# Patient Record
Sex: FEMALE | Race: WHITE | NOT HISPANIC OR LATINO | Employment: FULL TIME | URBAN - METROPOLITAN AREA
[De-identification: names, ages, dates, MRNs, and addresses within clinical notes are randomized per-mention and may not be internally consistent; named-entity substitution may affect disease eponyms.]

---

## 2018-06-18 ENCOUNTER — OFFICE VISIT (OUTPATIENT)
Dept: URGENT CARE | Age: 38
End: 2018-06-18
Payer: COMMERCIAL

## 2018-06-18 VITALS
BODY MASS INDEX: 22.71 KG/M2 | TEMPERATURE: 96.8 F | RESPIRATION RATE: 16 BRPM | HEART RATE: 77 BPM | WEIGHT: 133 LBS | DIASTOLIC BLOOD PRESSURE: 79 MMHG | SYSTOLIC BLOOD PRESSURE: 132 MMHG | HEIGHT: 64 IN | OXYGEN SATURATION: 100 %

## 2018-06-18 DIAGNOSIS — J01.00 ACUTE MAXILLARY SINUSITIS, RECURRENCE NOT SPECIFIED: Primary | ICD-10-CM

## 2018-06-18 PROCEDURE — 99203 OFFICE O/P NEW LOW 30 MIN: CPT | Performed by: PHYSICIAN ASSISTANT

## 2018-06-18 RX ORDER — BENZONATATE 100 MG/1
100 CAPSULE ORAL 3 TIMES DAILY PRN
Qty: 20 CAPSULE | Refills: 0 | Status: SHIPPED | OUTPATIENT
Start: 2018-06-18 | End: 2018-12-03

## 2018-06-18 RX ORDER — AMILORIDE HCL 5 MG
10 TABLET ORAL EVERY 6 HOURS PRN
Qty: 16 TABLET | Refills: 0 | Status: SHIPPED | OUTPATIENT
Start: 2018-06-18 | End: 2018-12-03

## 2018-06-18 RX ORDER — AZITHROMYCIN 250 MG/1
TABLET, FILM COATED ORAL
Qty: 6 TABLET | Refills: 0 | Status: SHIPPED | OUTPATIENT
Start: 2018-06-18 | End: 2018-06-22

## 2018-06-18 NOTE — PATIENT INSTRUCTIONS

## 2018-06-18 NOTE — PROGRESS NOTES
[facility]        NAME: Ksenia Vance is a 45 y o  female  : 1980    MRN: [id number]  DATE: 2018  TIME: 9:57 AM    Assessment and Plan   Acute maxillary sinusitis, recurrence not specified [J01 00]  1  Acute maxillary sinusitis, recurrence not specified  azithromycin (ZITHROMAX) 250 mg tablet    phenylephrine (SUDAFED PE) 10 MG TABS    benzonatate (TESSALON PERLES) 100 mg capsule         Patient Instructions       Take medications as directed  Drink plenty of fluids  Follow up with family doctor this week  Go to ER immediately if new or worsening symptoms occur  Chief Complaint     Chief Complaint   Patient presents with    Cold Like Symptoms     Pt c/o cough, chest tightness from coughing, stuffiness, and b/l ear pressure for the past 5 days  History of Present Illness       Sinusitis   This is a new problem  Episode onset: 6 days ago  The problem has been gradually worsening since onset  There has been no fever  Her pain is at a severity of 6/10  Associated symptoms include chills, congestion, coughing, ear pain, sinus pressure and a sore throat  Pertinent negatives include no diaphoresis, headaches, hoarse voice, neck pain, shortness of breath, sneezing or swollen glands  Treatments tried: Claritin  The treatment provided no relief  Review of Systems   Review of Systems   Constitutional: Positive for chills  Negative for diaphoresis, fatigue and fever  HENT: Positive for congestion, ear pain, postnasal drip, sinus pain, sinus pressure and sore throat  Negative for hoarse voice, rhinorrhea, sneezing, trouble swallowing and voice change  Eyes: Negative  Respiratory: Positive for cough  Negative for chest tightness, shortness of breath and wheezing  Cardiovascular: Negative for chest pain and palpitations  Gastrointestinal: Negative for constipation, diarrhea, nausea and vomiting  Endocrine: Negative  Genitourinary: Negative for dysuria  Musculoskeletal: Negative for back pain, myalgias and neck pain  Skin: Negative for pallor and rash  Allergic/Immunologic: Negative  Neurological: Negative for dizziness, syncope and headaches  Hematological: Negative  Psychiatric/Behavioral: Negative  Current Medications       Current Outpatient Prescriptions:     azithromycin (ZITHROMAX) 250 mg tablet, Take 2 tablets today then 1 tablet daily x 4 days, Disp: 6 tablet, Rfl: 0    benzonatate (TESSALON PERLES) 100 mg capsule, Take 1 capsule (100 mg total) by mouth 3 (three) times a day as needed for cough, Disp: 20 capsule, Rfl: 0    phenylephrine (SUDAFED PE) 10 MG TABS, Take 1 tablet (10 mg total) by mouth every 6 (six) hours as needed for congestion, Disp: 16 tablet, Rfl: 0    Current Allergies     Allergies as of 06/18/2018 - Reviewed 06/18/2018   Allergen Reaction Noted    Penicillins Other (See Comments) 09/16/2003    Sulfa antibiotics Other (See Comments) 09/16/2003            The following portions of the patient's history were reviewed and updated as appropriate: allergies, current medications, past family history, past medical history, past social history, past surgical history and problem list      History reviewed  No pertinent past medical history  History reviewed  No pertinent surgical history  History reviewed  No pertinent family history  Medications have been verified  Objective   /79   Pulse 77   Temp (!) 96 8 °F (36 °C)   Resp 16   Ht 5' 4" (1 626 m)   Wt 60 3 kg (133 lb)   SpO2 100%   BMI 22 83 kg/m²        Physical Exam     Physical Exam   Constitutional: She appears well-developed and well-nourished  No distress  HENT:   Head: Normocephalic and atraumatic  Right Ear: External ear normal    Left Ear: External ear normal    TM intact and pearly bilaterally  Clear nasal discharge bilaterally  Swollen turbinates  Postnasal discharge and erythematous posterior pharynx       Eyes: Conjunctivae are normal  Right eye exhibits no discharge  Left eye exhibits no discharge  Neck: Normal range of motion  Neck supple  Cardiovascular: Normal rate, regular rhythm, normal heart sounds and intact distal pulses  Pulmonary/Chest: Effort normal and breath sounds normal  No respiratory distress  She has no wheezes  She has no rales  Lymphadenopathy:     She has no cervical adenopathy  Skin: Skin is warm  No rash noted  She is not diaphoretic  Nursing note and vitals reviewed

## 2018-06-18 NOTE — LETTER
June 18, 2018     Patient: Marion Isidro   YOB: 1980   Date of Visit: 6/18/2018       To Whom It May Concern: It is my medical opinion that Marion Isidro may return to work on 06/19/2018  If you have any questions or concerns, please don't hesitate to call           Sincerely,        Laird Paget, PA-C    CC: No Recipients

## 2018-12-03 ENCOUNTER — OFFICE VISIT (OUTPATIENT)
Dept: URGENT CARE | Age: 38
End: 2018-12-03
Payer: COMMERCIAL

## 2018-12-03 VITALS
HEART RATE: 67 BPM | WEIGHT: 147 LBS | SYSTOLIC BLOOD PRESSURE: 124 MMHG | OXYGEN SATURATION: 98 % | HEIGHT: 64 IN | DIASTOLIC BLOOD PRESSURE: 69 MMHG | TEMPERATURE: 98.6 F | BODY MASS INDEX: 25.1 KG/M2 | RESPIRATION RATE: 16 BRPM

## 2018-12-03 DIAGNOSIS — J01.90 ACUTE SINUSITIS, RECURRENCE NOT SPECIFIED, UNSPECIFIED LOCATION: ICD-10-CM

## 2018-12-03 DIAGNOSIS — J06.9 VIRAL URI: Primary | ICD-10-CM

## 2018-12-03 PROCEDURE — 99213 OFFICE O/P EST LOW 20 MIN: CPT | Performed by: PHYSICIAN ASSISTANT

## 2018-12-03 RX ORDER — FLUTICASONE PROPIONATE 50 MCG
1 SPRAY, SUSPENSION (ML) NASAL DAILY
Qty: 16 G | Refills: 0 | Status: SHIPPED | OUTPATIENT
Start: 2018-12-03

## 2018-12-03 RX ORDER — OXYMETAZOLINE HYDROCHLORIDE 0.05 G/100ML
2 SPRAY NASAL 2 TIMES DAILY
Qty: 30 ML | Refills: 0 | Status: SHIPPED | OUTPATIENT
Start: 2018-12-03

## 2018-12-03 RX ORDER — LEVOFLOXACIN 500 MG/1
500 TABLET, FILM COATED ORAL EVERY 24 HOURS
Qty: 7 TABLET | Refills: 0 | Status: SHIPPED | OUTPATIENT
Start: 2018-12-03 | End: 2018-12-10

## 2018-12-03 RX ORDER — LORATADINE 10 MG/1
10 TABLET ORAL DAILY
Qty: 30 TABLET | Refills: 0 | Status: SHIPPED | OUTPATIENT
Start: 2018-12-03

## 2018-12-03 NOTE — PROGRESS NOTES
[facility]        NAME: Debra Troy is a 45 y o  female  : 1980    MRN: [id number]  DATE: December 3, 2018  TIME: 8:40 AM    Assessment and Plan   Viral URI [J06 9]  1  Viral URI  fluticasone (FLONASE) 50 mcg/act nasal spray    loratadine (CLARITIN) 10 mg tablet    oxymetazoline (AFRIN) 0 05 % nasal spray   2  Acute sinusitis, recurrence not specified, unspecified location  levofloxacin (LEVAQUIN) 500 mg tablet     Instructed to take abx if fever develops or if symptom worsen by day 7 at the earliest      Patient Instructions     Hold Levofloxacin until symptomatic for 7-10 days  Flonase and claritin  Fluids and rest  Nasal saline spray; Afrin if severe congestion (do not use for more than 3 days)  Over the counter decongestant  Tylenol/Ibuprofen for pain/fever  Warm compresses over sinuses  Steam treatment (utilize proper safety precautions when in contact with hot water/steam)  Follow up with PCP in 3-5 days  Proceed to  ER if symptoms worsen  Chief Complaint     Chief Complaint   Patient presents with    Cold Like Symptoms     Pt c/o head congestion, headache, post-nasal drip, cough, and upset stomach for the past 5 days  Denies fever  Has been taking Sudafed and ibuprofen for symptoms  History of Present Illness       URI    This is a new problem  Episode onset: 5 days  The problem has been gradually worsening  There has been no fever  Associated symptoms include congestion, ear pain, headaches, sinus pain and vomiting (attributes to PND)  Pertinent negatives include no abdominal pain, chest pain, coughing, diarrhea, nausea, rash, rhinorrhea, sneezing or sore throat  She has tried NSAIDs and decongestant for the symptoms  The treatment provided mild relief  Review of Systems   Review of Systems   Constitutional: Negative for activity change, appetite change, chills and fever  HENT: Positive for congestion, ear pain, postnasal drip, sinus pain and sinus pressure  Negative for dental problem, ear discharge, facial swelling, rhinorrhea, sneezing, sore throat and trouble swallowing  Eyes: Negative for itching  Respiratory: Negative for cough and shortness of breath  Cardiovascular: Negative for chest pain and palpitations  Gastrointestinal: Positive for vomiting (attributes to PND)  Negative for abdominal pain, constipation, diarrhea and nausea  Musculoskeletal: Negative for myalgias  Skin: Negative for rash  Neurological: Positive for headaches  Negative for dizziness, weakness and light-headedness  Current Medications       Current Outpatient Prescriptions:     fluticasone (FLONASE) 50 mcg/act nasal spray, 1 spray into each nostril daily, Disp: 16 g, Rfl: 0    levofloxacin (LEVAQUIN) 500 mg tablet, Take 1 tablet (500 mg total) by mouth every 24 hours for 7 days, Disp: 7 tablet, Rfl: 0    loratadine (CLARITIN) 10 mg tablet, Take 1 tablet (10 mg total) by mouth daily, Disp: 30 tablet, Rfl: 0    oxymetazoline (AFRIN) 0 05 % nasal spray, 2 sprays by Each Nare route 2 (two) times a day, Disp: 30 mL, Rfl: 0    Current Allergies     Allergies as of 2018 - Reviewed 2018   Allergen Reaction Noted    Penicillins Other (See Comments) 2003    Sulfa antibiotics Other (See Comments) 2003            The following portions of the patient's history were reviewed and updated as appropriate: allergies, current medications, past family history, past medical history, past social history, past surgical history and problem list      History reviewed  No pertinent past medical history  Past Surgical History:   Procedure Laterality Date     SECTION         History reviewed  No pertinent family history  Medications have been verified          Objective   /69   Pulse 67   Temp 98 6 °F (37 °C)   Resp 16   Ht 5' 4" (1 626 m)   Wt 66 7 kg (147 lb)   LMP 2018 (Exact Date)   SpO2 98%   BMI 25 23 kg/m²        Physical Exam     Physical Exam   Constitutional: She is oriented to person, place, and time  She appears well-developed and well-nourished  No distress  HENT:   Head: Normocephalic and atraumatic  Right Ear: External ear normal    Left Ear: External ear normal    Mouth/Throat: Oropharynx is clear and moist  No oropharyngeal exudate  Frontal and maxillary sinus tenderness to palpation  Eyes: Conjunctivae are normal  Right eye exhibits no discharge  Left eye exhibits no discharge  Cardiovascular: Normal rate, regular rhythm and normal heart sounds  Exam reveals no gallop and no friction rub  No murmur heard  Pulmonary/Chest: Effort normal and breath sounds normal  No respiratory distress  She has no wheezes  She has no rales  She exhibits no tenderness  Lymphadenopathy:     She has no cervical adenopathy  Neurological: She is alert and oriented to person, place, and time  Skin: Skin is warm  No rash noted  Psychiatric: She has a normal mood and affect  Her behavior is normal  Judgment and thought content normal    Vitals reviewed

## 2018-12-03 NOTE — PATIENT INSTRUCTIONS
Hold Levofloxacin until symptomatic for 7-10 days  Flonase and claritin  Fluids and rest  Nasal saline spray; Afrin if severe congestion (do not use for more than 3 days)  Over the counter decongestant  Tylenol/Ibuprofen for pain/fever  Warm compresses over sinuses  Steam treatment (utilize proper safety precautions when in contact with hot water/steam)  Follow up with PCP in 3-5 days  Proceed to  ER if symptoms worsen  Cold Symptoms   WHAT YOU NEED TO KNOW:   A cold is an infection caused by a virus  The infection causes your upper respiratory system to become inflamed  Common symptoms of a cold include sneezing, dry throat, a stuffy nose, headache, watery eyes, and a cough  Your cough may be dry, or you may cough up mucus  You may also have muscle aches, joint pain, and tiredness  Rarely, you may have a fever  Most colds go away without treatment  DISCHARGE INSTRUCTIONS:   Return to the emergency department if:   · You have increased tiredness and weakness  · You are unable to eat  · Your heart is beating much faster than usual for you  · You see white spots in the back of your throat and your neck is swollen and sore to the touch  · You see pinpoint or larger reddish-purple dots on your skin  Contact your healthcare provider if:   · You have a fever higher than 102°F (38 9°C)  · You have new or worsening shortness of breath  · You have thick nasal drainage for more than 2 days  · Your symptoms do not improve or get worse within 5 days  · You have questions or concerns about your condition or care  Medicines: The following medicines may be suggested by your healthcare provider to decrease your cold symptoms  These medicines are available without a doctor's order  Ask which medicines to take and when to take them  Follow directions  · NSAIDs or acetaminophen  help to bring down a fever or decrease pain  · Decongestants  help decrease nasal stuffiness       · Antihistamines help decrease sneezing and a runny nose  · Cough suppressants  help decrease how much you cough  · Expectorants  help loosen mucus so you can cough it up  · Take your medicine as directed  Contact your healthcare provider if you think your medicine is not helping or if you have side effects  Tell him of her if you are allergic to any medicine  Keep a list of the medicines, vitamins, and herbs you take  Include the amounts, and when and why you take them  Bring the list or the pill bottles to follow-up visits  Carry your medicine list with you in case of an emergency  Symptom relief: The following may help relieve cold symptoms, such as a dry throat and congestion:  · Gargle with mouthwash or warm salt water as directed  · Suck on throat lozenges or hard candy  · Use a cold or warm vaporizer or humidifier to ease your breathing  · Rest for at least 2 days and then as needed to decrease tiredness and weakness  · Use petroleum based jelly around your nostrils to decrease irritation from blowing your nose  Drink liquids:  Liquids will help thin and loosen thick mucus so you can cough it up  Liquids will also keep you hydrated  Ask your healthcare provider which liquids are best for you and how much to drink each day  Prevent the spread of germs: You can spread your cold germs to others for at least 3 days after your symptoms start  Wash your hands often  Do not share items, such as eating utensils  Cover your nose and mouth when you cough or sneeze using the crook of your elbow instead of your hands  Throw used tissues in the garbage  Do not smoke:  Smoking may worsen your symptoms and increase the length of time you feel sick  Talk with your healthcare provider if you need help to stop smoking  Follow up with your healthcare provider as directed:  Write down your questions so you remember to ask them during your visits     © 2017 [employer] Information is for End User's use only and may not be sold, redistributed or otherwise used for commercial purposes  All illustrations and images included in CareNotes® are the copyrighted property of [employer]  or [employer]  The above information is an  only  It is not intended as medical advice for individual conditions or treatments  Talk to your doctor, nurse or pharmacist before following any medical regimen to see if it is safe and effective for you

## 2018-12-03 NOTE — LETTER
December 3, 2018     Patient: Tami Diop   YOB: 1980   Date of Visit: 12/3/2018       To Whom It May Concern: It is my medical opinion that Tami Diop may return to work on 12/4/2018  If you have any questions or concerns, please don't hesitate to call           Sincerely,        Bess Diggs PA-C    CC: No Recipients

## 2021-04-08 DIAGNOSIS — Z23 ENCOUNTER FOR IMMUNIZATION: ICD-10-CM
